# Patient Record
Sex: FEMALE | Race: ASIAN | NOT HISPANIC OR LATINO
[De-identification: names, ages, dates, MRNs, and addresses within clinical notes are randomized per-mention and may not be internally consistent; named-entity substitution may affect disease eponyms.]

---

## 2021-10-15 ENCOUNTER — RX ONLY (OUTPATIENT)
Age: 26
Setting detail: RX ONLY
End: 2021-10-15

## 2021-10-15 ENCOUNTER — APPOINTMENT (RX ONLY)
Dept: URBAN - METROPOLITAN AREA CLINIC 316 | Facility: CLINIC | Age: 26
Setting detail: DERMATOLOGY
End: 2021-10-15

## 2021-10-15 DIAGNOSIS — L30.9 DERMATITIS, UNSPECIFIED: ICD-10-CM | Status: WORSENING

## 2021-10-15 DIAGNOSIS — D22 MELANOCYTIC NEVI: ICD-10-CM

## 2021-10-15 DIAGNOSIS — L81.4 OTHER MELANIN HYPERPIGMENTATION: ICD-10-CM

## 2021-10-15 PROBLEM — D22.5 MELANOCYTIC NEVI OF TRUNK: Status: ACTIVE | Noted: 2021-10-15

## 2021-10-15 PROCEDURE — ? SUNSCREEN RECOMMENDATIONS

## 2021-10-15 PROCEDURE — ? SCREENING FOR COVID-19

## 2021-10-15 PROCEDURE — 99204 OFFICE O/P NEW MOD 45 MIN: CPT

## 2021-10-15 PROCEDURE — ? COUNSELING

## 2021-10-15 PROCEDURE — ? PRESCRIPTION

## 2021-10-15 PROCEDURE — ? ADDITIONAL NOTES

## 2021-10-15 PROCEDURE — ? FULL BODY SKIN EXAM - DECLINED

## 2021-10-15 RX ORDER — CLOBETASOL PROPIONATE 0.05 MG/G
GEL TOPICAL BID
Qty: 120 | Refills: 3 | Status: CANCELLED | COMMUNITY
Start: 2021-10-15

## 2021-10-15 RX ORDER — PREDNISONE 10 MG/1
TABLET ORAL DAILY
Qty: 40 | Refills: 0 | Status: CANCELLED

## 2021-10-15 RX ORDER — PREDNISONE 10 MG/1
AS DIRECTED TABLET ORAL DAILY
Qty: 40 | Refills: 0 | Status: ERX

## 2021-10-15 RX ORDER — CLOBETASOL PROPIONATE 0.05 MG/G
1 APPLICATION GEL TOPICAL BID
Qty: 60 | Refills: 3 | Status: ERX

## 2021-10-15 RX ADMIN — CLOBETASOL PROPIONATE: 0.05 GEL TOPICAL at 00:00

## 2021-10-15 ASSESSMENT — LOCATION SIMPLE DESCRIPTION DERM
LOCATION SIMPLE: RIGHT BUTTOCK
LOCATION SIMPLE: LEFT BUTTOCK
LOCATION SIMPLE: UPPER BACK

## 2021-10-15 ASSESSMENT — LOCATION DETAILED DESCRIPTION DERM
LOCATION DETAILED: SUPERIOR THORACIC SPINE
LOCATION DETAILED: RIGHT BUTTOCK
LOCATION DETAILED: LEFT BUTTOCK

## 2021-10-15 ASSESSMENT — LOCATION ZONE DERM: LOCATION ZONE: TRUNK

## 2021-10-15 NOTE — PROCEDURE: MIPS QUALITY
Quality 431: Preventive Care And Screening: Unhealthy Alcohol Use - Screening: Patient screened for unhealthy alcohol use using a single question and scores less than 2 times per year
Detail Level: Detailed
Quality 226: Preventive Care And Screening: Tobacco Use: Screening And Cessation Intervention: Patient screened for tobacco use and is an ex/non-smoker
Quality 110: Preventive Care And Screening: Influenza Immunization: Influenza Immunization previously received during influenza season
Quality 130: Documentation Of Current Medications In The Medical Record: Current Medications Documented

## 2021-10-15 NOTE — HPI: RASH
What Type Of Note Output Would You Prefer (Optional)?: Bullet Format
How Severe Is Your Rash?: mild
Is This A New Presentation, Or A Follow-Up?: Rash
Additional History: Triamcinolone has not helped

## 2021-10-15 NOTE — PROCEDURE: ADDITIONAL NOTES
Additional Notes: Change cleaning products to all natural products
Render Risk Assessment In Note?: no
Detail Level: Simple

## 2021-10-15 NOTE — PROCEDURE: COUNSELING
Moisturizer Recommendations: Aquaphor or Vaseline ointment
Detail Level: Zone
Detail Level: Generalized

## 2021-11-30 ENCOUNTER — RX ONLY (OUTPATIENT)
Age: 26
Setting detail: RX ONLY
End: 2021-11-30

## 2021-11-30 RX ORDER — BETAMETHASONE DIPROPIONATE 0.5 MG/G
0.05% OINTMENT, AUGMENTED TOPICAL BID
Qty: 50 | Refills: 3 | Status: ERX | COMMUNITY
Start: 2021-11-29

## 2022-10-10 ENCOUNTER — APPOINTMENT (RX ONLY)
Dept: URBAN - METROPOLITAN AREA CLINIC 316 | Facility: CLINIC | Age: 27
Setting detail: DERMATOLOGY
End: 2022-10-10

## 2022-10-10 ENCOUNTER — RX ONLY (OUTPATIENT)
Age: 27
Setting detail: RX ONLY
End: 2022-10-10

## 2022-10-10 DIAGNOSIS — L30.9 DERMATITIS, UNSPECIFIED: ICD-10-CM

## 2022-10-10 DIAGNOSIS — L81.0 POSTINFLAMMATORY HYPERPIGMENTATION: ICD-10-CM | Status: INADEQUATELY CONTROLLED

## 2022-10-10 PROCEDURE — ? SCREENING FOR COVID-19

## 2022-10-10 PROCEDURE — ? ADDITIONAL NOTES

## 2022-10-10 PROCEDURE — 99213 OFFICE O/P EST LOW 20 MIN: CPT

## 2022-10-10 PROCEDURE — ? PRESCRIPTION MEDICATION MANAGEMENT

## 2022-10-10 PROCEDURE — ? COUNSELING

## 2022-10-10 RX ORDER — CLOBETASOL PROPIONATE 0.05 MG/G
1 APPLICATION GEL TOPICAL BID
Qty: 60 | Refills: 5 | Status: ERX

## 2022-10-10 ASSESSMENT — LOCATION SIMPLE DESCRIPTION DERM
LOCATION SIMPLE: LEFT BUTTOCK
LOCATION SIMPLE: RIGHT BUTTOCK

## 2022-10-10 ASSESSMENT — LOCATION DETAILED DESCRIPTION DERM
LOCATION DETAILED: LEFT BUTTOCK
LOCATION DETAILED: RIGHT BUTTOCK

## 2022-10-10 ASSESSMENT — LOCATION ZONE DERM: LOCATION ZONE: TRUNK

## 2022-10-10 NOTE — PROCEDURE: COUNSELING
Detail Level: Detailed
Moisturizer Recommendations: Aquaphor or Vaseline ointment
Detail Level: Zone

## 2022-10-10 NOTE — PROCEDURE: PRESCRIPTION MEDICATION MANAGEMENT
Continue Regimen: Clobetasol gel as previously prescribed
Render In Strict Bullet Format?: No
Detail Level: Zone

## 2022-10-10 NOTE — PROCEDURE: ADDITIONAL NOTES
Detail Level: Simple
Additional Notes: Patient’s rash is much better than before. She only has one small lesion on the right posterior buttocks that it not healing because she keeps picking at it. Patient instructed to use a Clobetasol twice a day with Eucerin or Aquaphor cream on top of it until it’s cleared up. She can also try taking a Zyrtec or Allegra tablet once a day
Render Risk Assessment In Note?: no

## 2023-10-30 ENCOUNTER — RX ONLY (OUTPATIENT)
Age: 28
Setting detail: RX ONLY
End: 2023-10-30

## 2023-10-30 RX ORDER — CLOBETASOL PROPIONATE 0.05 MG/G
1 APPLICATION GEL TOPICAL BID
Qty: 60 | Refills: 0 | Status: ERX